# Patient Record
Sex: MALE | Race: BLACK OR AFRICAN AMERICAN | NOT HISPANIC OR LATINO
[De-identification: names, ages, dates, MRNs, and addresses within clinical notes are randomized per-mention and may not be internally consistent; named-entity substitution may affect disease eponyms.]

---

## 2022-02-12 ENCOUNTER — TRANSCRIPTION ENCOUNTER (OUTPATIENT)
Age: 42
End: 2022-02-12

## 2022-07-07 ENCOUNTER — APPOINTMENT (OUTPATIENT)
Dept: ORTHOPEDIC SURGERY | Facility: CLINIC | Age: 42
End: 2022-07-07

## 2022-07-07 PROBLEM — Z00.00 ENCOUNTER FOR PREVENTIVE HEALTH EXAMINATION: Status: ACTIVE | Noted: 2022-07-07

## 2022-08-24 ENCOUNTER — APPOINTMENT (OUTPATIENT)
Dept: ORTHOPEDIC SURGERY | Facility: CLINIC | Age: 42
End: 2022-08-24

## 2022-08-24 VITALS — HEIGHT: 71 IN | BODY MASS INDEX: 30.24 KG/M2 | WEIGHT: 216 LBS

## 2022-08-24 DIAGNOSIS — Z78.9 OTHER SPECIFIED HEALTH STATUS: ICD-10-CM

## 2022-08-24 DIAGNOSIS — S93.602A UNSPECIFIED SPRAIN OF LEFT FOOT, INITIAL ENCOUNTER: ICD-10-CM

## 2022-08-24 PROCEDURE — 99214 OFFICE O/P EST MOD 30 MIN: CPT

## 2022-08-24 PROCEDURE — 73630 X-RAY EXAM OF FOOT: CPT | Mod: LT

## 2022-08-24 NOTE — HISTORY OF PRESENT ILLNESS
[6] : 6 [Sharp] : sharp [de-identified] : Pt. is a 41 year old male who presents for evaluation of his LT foot. Reports twisting injury on the stairs 8/21/22. NSAIDS prn. WB in supportive footwear. Ice to affected area. He reports symptoms already improving. H/O LT medial cuneiform fracture earlier in the year, treated by Dr. Cedeno which resolved without issue.  [] : no [FreeTextEntry1] : Lt foot  [FreeTextEntry3] : 8/21/22 [FreeTextEntry5] : Pt sates he slipped down the steps and injured his foot on 8/21/22. [de-identified] : touch

## 2022-08-24 NOTE — RETURN TO WORK/SCHOOL
[FreeTextEntry1] : The patient is unable to work at this time. This will be reevaluated at their next office visit on 8/31/22.\par

## 2022-08-24 NOTE — ASSESSMENT
[FreeTextEntry1] : WBAT in CAM boot (patient has at home).\par \par Patient was instructed that they can not operate an automatic vehicle while wearing a CAM boot or cast on the right lower extremity. If operating a vehicle that requires use of a clutch, patient may not drive while wearing a CAM boot or cast on the left lower extremity.\par \par Ice to affected area.\par NSAIDS prn.\par OOW at this time.

## 2022-08-24 NOTE — PHYSICAL EXAM
[NL (40)] : plantar flexion 40 degrees [5___] : [facility] 5[unfilled]/5 [2+] : posterior tibialis pulse: 2+ [Normal] : saphenous nerve sensation normal [1st] : 1st [4___] : eversion 4[unfilled]/5 [Left] : left foot [Weight -] : weightbearing [] : no pain when stressing lateral tarsal metatarsal joint [FreeTextEntry8] : ttp NC joint [de-identified] : Bipartite medial sesamoid, ossicle medial to 2nd MTPJ.  [de-identified] : inversion 10 degrees [de-identified] : eversion 15 degrees [TWNoteComboBox7] : dorsiflexion 10 degrees

## 2022-08-26 ENCOUNTER — APPOINTMENT (OUTPATIENT)
Dept: ORTHOPEDIC SURGERY | Facility: CLINIC | Age: 42
End: 2022-08-26

## 2022-08-26 DIAGNOSIS — S93.602D UNSPECIFIED SPRAIN OF LEFT FOOT, SUBSEQUENT ENCOUNTER: ICD-10-CM

## 2022-08-26 PROCEDURE — 99213 OFFICE O/P EST LOW 20 MIN: CPT

## 2022-08-26 NOTE — PHYSICAL EXAM
[NL (40)] : plantar flexion 40 degrees [2+] : posterior tibialis pulse: 2+ [Normal] : saphenous nerve sensation normal [Left] : left foot [Weight -] : weightbearing [de-identified] : Bipartite medial sesamoid, ossicle medial to 2nd MTPJ.  [NL 30)] : inversion 30 degrees [NL (20)] : eversion 20 degrees [5___] : eversion 5[unfilled]/5 [] : non-antalgic [de-identified] : inversion 10 degrees [de-identified] : eversion 0 degrees [TWNoteComboBox7] : dorsiflexion 10 degrees

## 2022-08-26 NOTE — HISTORY OF PRESENT ILLNESS
[Sharp] : sharp [0] : 0 [de-identified] : Pt. presents for f/u LT foot sprain after twisting injury on the stairs 8/21/22. He presents today in slides. He reports no pain at this time.  [] : no [FreeTextEntry1] : Lt foot  [FreeTextEntry3] : 8/21/22 [FreeTextEntry5] : Pt sates he slipped down the steps and injured his foot on 8/21/22. [de-identified] : touch

## 2022-08-26 NOTE — ASSESSMENT
[FreeTextEntry1] : Patient has recovered well, and can slowly resume activities as tolerated.  Home stretching exercises were encouraged to maintain flexibility. Ice/nsaids can be used as needed.\par \par He is clear to return to work, full duty without restrictions as of 9/1/22.

## 2022-09-07 ENCOUNTER — APPOINTMENT (OUTPATIENT)
Dept: ORTHOPEDIC SURGERY | Facility: CLINIC | Age: 42
End: 2022-09-07

## 2022-11-26 ENCOUNTER — NON-APPOINTMENT (OUTPATIENT)
Age: 42
End: 2022-11-26

## 2022-11-29 ENCOUNTER — NON-APPOINTMENT (OUTPATIENT)
Age: 42
End: 2022-11-29

## 2023-01-25 ENCOUNTER — NON-APPOINTMENT (OUTPATIENT)
Age: 43
End: 2023-01-25

## 2023-04-22 ENCOUNTER — APPOINTMENT (OUTPATIENT)
Dept: ORTHOPEDIC SURGERY | Facility: CLINIC | Age: 43
End: 2023-04-22
Payer: COMMERCIAL

## 2023-04-22 VITALS — WEIGHT: 260 LBS | HEIGHT: 71 IN | BODY MASS INDEX: 36.4 KG/M2

## 2023-04-22 DIAGNOSIS — S39.012A STRAIN OF MUSCLE, FASCIA AND TENDON OF LOWER BACK, INITIAL ENCOUNTER: ICD-10-CM

## 2023-04-22 PROCEDURE — 72100 X-RAY EXAM L-S SPINE 2/3 VWS: CPT

## 2023-04-22 PROCEDURE — 99213 OFFICE O/P EST LOW 20 MIN: CPT

## 2023-04-22 PROCEDURE — 72170 X-RAY EXAM OF PELVIS: CPT

## 2023-04-22 RX ORDER — CYCLOBENZAPRINE HYDROCHLORIDE 5 MG/1
5 TABLET, FILM COATED ORAL
Qty: 30 | Refills: 0 | Status: ACTIVE | COMMUNITY
Start: 2023-04-22 | End: 1900-01-01

## 2023-04-22 RX ORDER — METHYLPREDNISOLONE 4 MG/1
4 TABLET ORAL
Qty: 1 | Refills: 0 | Status: ACTIVE | COMMUNITY
Start: 2023-04-22 | End: 1900-01-01

## 2023-04-22 NOTE — IMAGING
[de-identified] : Back / Spine:\par Inspection: No erythema and ecchymosis.\par Palpation: b/l lumbar paraspinal tenderness. \par Range of motion:  Near full range of motion but with mild stiffness. \par Strength Testing: motor exam is 5/5 throughout both lower extremities with normal tone\par Neurological testing: light touch is intact throughout both lower extremities\par Straight Leg Raise: neg\par [place] test: neg bilaterally\par \par  [Disc space narrowing] : Disc space narrowing

## 2023-04-22 NOTE — HISTORY OF PRESENT ILLNESS
[7] : 7 [Localized] : localized [8] : 8 [Sharp] : sharp [Stabbing] : stabbing [Throbbing] : throbbing [Constant] : constant [Ice] : ice [Sitting] : sitting [Bending forward] : bending forward [Stairs] : stairs [de-identified] : LBP and R sided mid back pain since 4/18. Thinks he injured himself at the gym. Denies radicular complaints. [] : no [FreeTextEntry1] : lower back  [FreeTextEntry3] : 4/20/2023 [FreeTextEntry5] : pt states no known injury, on Thursday he was not able to get up from bed [de-identified] : motion

## 2023-05-11 ENCOUNTER — APPOINTMENT (OUTPATIENT)
Dept: ORTHOPEDIC SURGERY | Facility: CLINIC | Age: 43
End: 2023-05-11

## 2023-06-16 ENCOUNTER — APPOINTMENT (OUTPATIENT)
Dept: ORTHOPEDIC SURGERY | Facility: CLINIC | Age: 43
End: 2023-06-16

## 2024-09-28 ENCOUNTER — NON-APPOINTMENT (OUTPATIENT)
Age: 44
End: 2024-09-28

## 2024-11-28 ENCOUNTER — NON-APPOINTMENT (OUTPATIENT)
Age: 44
End: 2024-11-28

## 2025-01-06 ENCOUNTER — NON-APPOINTMENT (OUTPATIENT)
Age: 45
End: 2025-01-06

## 2025-02-22 ENCOUNTER — NON-APPOINTMENT (OUTPATIENT)
Age: 45
End: 2025-02-22

## 2025-05-09 ENCOUNTER — NON-APPOINTMENT (OUTPATIENT)
Age: 45
End: 2025-05-09